# Patient Record
Sex: MALE | Race: OTHER | Employment: PART TIME | URBAN - METROPOLITAN AREA
[De-identification: names, ages, dates, MRNs, and addresses within clinical notes are randomized per-mention and may not be internally consistent; named-entity substitution may affect disease eponyms.]

---

## 2020-05-16 ENCOUNTER — APPOINTMENT (OUTPATIENT)
Dept: GENERAL RADIOLOGY | Facility: HOSPITAL | Age: 20
End: 2020-05-16
Attending: NURSE PRACTITIONER
Payer: OTHER GOVERNMENT

## 2020-05-16 ENCOUNTER — HOSPITAL ENCOUNTER (EMERGENCY)
Facility: HOSPITAL | Age: 20
Discharge: HOME | End: 2020-05-16
Payer: OTHER GOVERNMENT

## 2020-05-16 VITALS
WEIGHT: 125 LBS | DIASTOLIC BLOOD PRESSURE: 82 MMHG | HEIGHT: 60 IN | RESPIRATION RATE: 20 BRPM | TEMPERATURE: 98 F | HEART RATE: 79 BPM | OXYGEN SATURATION: 99 % | BODY MASS INDEX: 24.54 KG/M2 | SYSTOLIC BLOOD PRESSURE: 150 MMHG

## 2020-05-16 DIAGNOSIS — J02.9 ACUTE PHARYNGITIS, UNSPECIFIED ETIOLOGY: Primary | ICD-10-CM

## 2020-05-16 PROCEDURE — 93010 ELECTROCARDIOGRAM REPORT: CPT | Performed by: NURSE PRACTITIONER

## 2020-05-16 PROCEDURE — 71045 X-RAY EXAM CHEST 1 VIEW: CPT | Performed by: NURSE PRACTITIONER

## 2020-05-16 PROCEDURE — 87430 STREP A AG IA: CPT

## 2020-05-16 PROCEDURE — 93005 ELECTROCARDIOGRAM TRACING: CPT

## 2020-05-16 PROCEDURE — 99284 EMERGENCY DEPT VISIT MOD MDM: CPT

## 2020-05-17 NOTE — ED PROVIDER NOTES
Patient Seen in: [facility] AND [facility] Emergency Department    History   CC: sore throat  HPI: Jammie Lott 20year old male  who presents to the ER c/o sore throat, tactile fever, and sob which has been present, constant in nature x2 days. No cp.  Took temp erythema and without tonsilar enlargement or exudate, uvula midline, +gag, voice is clear  Neck - no significant adenopathy, supple with trachea midline  Resp - Lung sounds clear bilaterally and wob unlabored, good aeration with equal, even expansion bilat Vital signs stable. Rapid COVID as well as rapid strep tests negative. Chest x-ray normal.  EKG within normal limits. Will discharge home at this time.   General acute pharyngitis instructions reviewed as well as follow-up and return precautions for ER r

## 2020-05-17 NOTE — ED NOTES
Language line  Clarisse Proctor used #[id number] by this writer and Selvin Eckert, provider, to review discharge instructions. Patient safe for discharge per provider. Patient able to dress self. Discharge teaching done, patient verbalizes understanding.  Ambulatory

## 2020-05-17 NOTE — ED NOTES
Pt speaks limited [place], language line  Jae used, #[id number]. Pt able to speak in full sentences, no respiratory distress noted.

## 2020-05-26 ENCOUNTER — HOSPITAL ENCOUNTER (EMERGENCY)
Facility: HOSPITAL | Age: 20
Discharge: HOME | End: 2020-05-26
Attending: PHYSICIAN ASSISTANT
Payer: OTHER GOVERNMENT

## 2020-05-26 VITALS
RESPIRATION RATE: 17 BRPM | TEMPERATURE: 99 F | DIASTOLIC BLOOD PRESSURE: 71 MMHG | SYSTOLIC BLOOD PRESSURE: 123 MMHG | HEART RATE: 88 BPM | OXYGEN SATURATION: 97 %

## 2020-05-26 DIAGNOSIS — Z20.822 COVID-19 VIRUS NOT DETECTED: Primary | ICD-10-CM

## 2020-05-26 PROCEDURE — 99283 EMERGENCY DEPT VISIT LOW MDM: CPT

## 2020-05-26 NOTE — ED PROVIDER NOTES
Patient Seen in: [facility] AND [facility] Emergency Department    History   Patient presents with:  Testing    Stated Complaint:     HPI    26-year-old male presents to the emergency department requesting testing for COVID-19. Patient denies sick contacts.   Pa within normal limits bilaterally. Mucous membranes moist.  Neck: The neck is supple. There is no evidence of JVD. No meningeal signs. Chest: There is no tenderness to the chest wall. No CVA tenderness bilaterally.   Respiratory: Respiratory effort was

## 2020-05-26 NOTE — ED INITIAL ASSESSMENT (HPI)
Pt seen here on 5/16 and has a negative covid  Test. Pt was sent here from his job for another covid test.

## (undated) NOTE — LETTER
[address] [place] [address]  [phone]            Patient: Deo Dunn   YOB: 2000   Date of Visit: 5/26/2020       Yuko Mackey,        May 26, 2020      Leiv Hernandez la aparición de los síntomas. Los empleados deben avisar a sabina supervisores y quedarse en dolan lugar.     · Para maximizar la seguridad de los empleados, empleadores y clientes, les recomendamos a los empleadores que les permitan a los pacientes de alto riesg